# Patient Record
Sex: FEMALE | Race: WHITE | URBAN - METROPOLITAN AREA
[De-identification: names, ages, dates, MRNs, and addresses within clinical notes are randomized per-mention and may not be internally consistent; named-entity substitution may affect disease eponyms.]

---

## 2021-07-30 ENCOUNTER — OFFICE VISIT (OUTPATIENT)
Dept: URBAN - METROPOLITAN AREA CLINIC 58 | Facility: CLINIC | Age: 71
End: 2021-07-30
Payer: MEDICARE

## 2021-07-30 DIAGNOSIS — H25.13 AGE-RELATED NUCLEAR CATARACT, BILATERAL: ICD-10-CM

## 2021-07-30 DIAGNOSIS — G43.109 MIGRAINE W/ AURA: Primary | ICD-10-CM

## 2021-07-30 PROCEDURE — 92004 COMPRE OPH EXAM NEW PT 1/>: CPT | Performed by: OPTOMETRIST

## 2021-07-30 ASSESSMENT — INTRAOCULAR PRESSURE
OD: 12
OS: 13

## 2021-07-30 NOTE — IMPRESSION/PLAN
Impression: Age-related nuclear cataract, bilateral: H25.13. Plan: Discussed diagnosis in detail with patient. No treatment is required at this time. Will continue to observe condition and or symptoms. Call if [address] worsens.

## 2021-07-30 NOTE — IMPRESSION/PLAN
Impression: Migraine w/ aura: G43.109. Plan: Discussed diagnosis in detail with patient. No treatment is required at this time. Will continue to observe condition and or symptoms. Call if [address] worsens.

## 2022-08-01 ENCOUNTER — OFFICE VISIT (OUTPATIENT)
Dept: URBAN - METROPOLITAN AREA CLINIC 58 | Facility: CLINIC | Age: 72
End: 2022-08-01
Payer: MEDICARE

## 2022-08-01 DIAGNOSIS — H25.13 AGE-RELATED NUCLEAR CATARACT, BILATERAL: Primary | ICD-10-CM

## 2022-08-01 DIAGNOSIS — H40.013 OPEN ANGLE WITH BORDERLINE FINDINGS, LOW RISK, BILATERAL: ICD-10-CM

## 2022-08-01 PROCEDURE — 92014 COMPRE OPH EXAM EST PT 1/>: CPT | Performed by: OPTOMETRIST

## 2022-08-01 ASSESSMENT — INTRAOCULAR PRESSURE
OS: 22
OD: 18
OS: 20
OD: 20

## 2022-08-01 ASSESSMENT — VISUAL ACUITY
OD: 20/20
OS: 20/40

## 2022-08-01 NOTE — IMPRESSION/PLAN
Impression: Age-related nuclear cataract, bilateral: H25.13. Plan: Discussed diagnosis in detail with patient. Patient declines cataract surgery at this time. Will continue to observe condition and or symptoms. Call if [address] worsens.

## 2023-08-02 ENCOUNTER — OFFICE VISIT (OUTPATIENT)
Dept: URBAN - METROPOLITAN AREA CLINIC 58 | Facility: CLINIC | Age: 73
End: 2023-08-02
Payer: MEDICARE

## 2023-08-02 DIAGNOSIS — H25.13 AGE-RELATED NUCLEAR CATARACT, BILATERAL: Primary | ICD-10-CM

## 2023-08-02 PROCEDURE — 99214 OFFICE O/P EST MOD 30 MIN: CPT | Performed by: OPTOMETRIST

## 2023-08-02 ASSESSMENT — INTRAOCULAR PRESSURE
OS: 23
OD: 22

## 2023-08-02 ASSESSMENT — VISUAL ACUITY
OD: 20/30
OS: 20/25

## 2023-08-15 ENCOUNTER — OFFICE VISIT (OUTPATIENT)
Dept: URBAN - METROPOLITAN AREA CLINIC 58 | Facility: CLINIC | Age: 73
End: 2023-08-15
Payer: MEDICARE

## 2023-08-15 DIAGNOSIS — H40.013 OPEN ANGLE WITH BORDERLINE FINDINGS, LOW RISK, BILATERAL: ICD-10-CM

## 2023-08-15 DIAGNOSIS — H25.13 AGE-RELATED NUCLEAR CATARACT, BILATERAL: Primary | ICD-10-CM

## 2023-08-15 PROCEDURE — 99204 OFFICE O/P NEW MOD 45 MIN: CPT | Performed by: OPHTHALMOLOGY

## 2023-08-15 ASSESSMENT — VISUAL ACUITY
OD: 20/40
OS: 20/40

## 2023-08-15 ASSESSMENT — INTRAOCULAR PRESSURE
OS: 20
OD: 18

## 2023-08-15 ASSESSMENT — KERATOMETRY
OS: 41.88
OD: 41.75

## 2023-08-24 ENCOUNTER — TESTING ONLY (OUTPATIENT)
Dept: URBAN - METROPOLITAN AREA CLINIC 58 | Facility: CLINIC | Age: 73
End: 2023-08-24
Payer: MEDICARE

## 2023-08-24 DIAGNOSIS — H25.13 AGE-RELATED NUCLEAR CATARACT, BILATERAL: Primary | ICD-10-CM

## 2023-08-24 PROCEDURE — W9997 IOL SELECTION: HCPCS | Performed by: OPHTHALMOLOGY

## 2023-08-24 ASSESSMENT — PACHYMETRY
OD: 2.34
OD: 23.62
OS: 2.36
OS: 23.60

## 2023-09-05 ENCOUNTER — SURGERY (OUTPATIENT)
Dept: URBAN - METROPOLITAN AREA SURGERY 32 | Facility: SURGERY | Age: 73
End: 2023-09-05
Payer: MEDICARE

## 2023-09-05 DIAGNOSIS — H25.13 AGE-RELATED NUCLEAR CATARACT, BILATERAL: Primary | ICD-10-CM

## 2023-09-05 PROCEDURE — 66984 XCAPSL CTRC RMVL W/O ECP: CPT | Performed by: OPHTHALMOLOGY

## 2023-09-06 ENCOUNTER — POST-OPERATIVE VISIT (OUTPATIENT)
Dept: URBAN - METROPOLITAN AREA CLINIC 58 | Facility: CLINIC | Age: 73
End: 2023-09-06
Payer: MEDICARE

## 2023-09-06 DIAGNOSIS — Z48.810 ENCOUNTER FOR SURGICAL AFTERCARE FOLLOWING SURGERY ON A SENSE ORGAN: Primary | ICD-10-CM

## 2023-09-06 PROCEDURE — 99024 POSTOP FOLLOW-UP VISIT: CPT | Performed by: OPTOMETRIST

## 2023-09-06 ASSESSMENT — INTRAOCULAR PRESSURE
OD: 17
OS: 18

## 2023-09-13 ENCOUNTER — POST-OPERATIVE VISIT (OUTPATIENT)
Dept: URBAN - METROPOLITAN AREA CLINIC 58 | Facility: CLINIC | Age: 73
End: 2023-09-13
Payer: MEDICARE

## 2023-09-13 DIAGNOSIS — Z48.810 ENCOUNTER FOR SURGICAL AFTERCARE FOLLOWING SURGERY ON A SENSE ORGAN: Primary | ICD-10-CM

## 2023-09-13 PROCEDURE — 99024 POSTOP FOLLOW-UP VISIT: CPT | Performed by: OPTOMETRIST

## 2023-09-13 RX ORDER — PREDNISOLONE ACETATE 10 MG/ML
1 % SUSPENSION/ DROPS OPHTHALMIC
Qty: 5 | Refills: 0 | Status: ACTIVE
Start: 2023-09-13

## 2023-09-13 ASSESSMENT — VISUAL ACUITY
OD: 20/30
OS: 20/40

## 2023-09-13 ASSESSMENT — KERATOMETRY
OD: 41.50
OS: 41.63

## 2023-09-13 ASSESSMENT — INTRAOCULAR PRESSURE
OS: 13
OD: 11

## 2023-09-25 ENCOUNTER — SURGERY (OUTPATIENT)
Dept: URBAN - METROPOLITAN AREA SURGERY 32 | Facility: SURGERY | Age: 73
End: 2023-09-25
Payer: MEDICARE

## 2023-09-25 DIAGNOSIS — H25.12 AGE-RELATED NUCLEAR CATARACT, LEFT EYE: Primary | ICD-10-CM

## 2023-09-25 PROCEDURE — 66984 XCAPSL CTRC RMVL W/O ECP: CPT | Performed by: OPHTHALMOLOGY

## 2023-09-26 ENCOUNTER — POST-OPERATIVE VISIT (OUTPATIENT)
Dept: URBAN - METROPOLITAN AREA CLINIC 58 | Facility: CLINIC | Age: 73
End: 2023-09-26
Payer: MEDICARE

## 2023-09-26 DIAGNOSIS — Z96.1 PRESENCE OF INTRAOCULAR LENS: Primary | ICD-10-CM

## 2023-09-26 PROCEDURE — 99024 POSTOP FOLLOW-UP VISIT: CPT | Performed by: OPTOMETRIST

## 2023-09-26 ASSESSMENT — INTRAOCULAR PRESSURE
OD: 11
OS: 12

## 2023-10-05 ENCOUNTER — POST-OPERATIVE VISIT (OUTPATIENT)
Dept: URBAN - METROPOLITAN AREA CLINIC 58 | Facility: CLINIC | Age: 73
End: 2023-10-05
Payer: MEDICARE

## 2023-10-05 DIAGNOSIS — Z96.1 PRESENCE OF INTRAOCULAR LENS: Primary | ICD-10-CM

## 2023-10-05 PROCEDURE — 99024 POSTOP FOLLOW-UP VISIT: CPT | Performed by: OPTOMETRIST

## 2023-10-05 ASSESSMENT — INTRAOCULAR PRESSURE
OD: 14
OS: 13

## 2024-01-05 ENCOUNTER — OFFICE VISIT (OUTPATIENT)
Dept: URBAN - METROPOLITAN AREA CLINIC 58 | Facility: CLINIC | Age: 74
End: 2024-01-05
Payer: COMMERCIAL

## 2024-01-05 DIAGNOSIS — H40.013 OPEN ANGLE WITH BORDERLINE FINDINGS, LOW RISK, BILATERAL: Primary | ICD-10-CM

## 2024-01-05 PROCEDURE — 92014 COMPRE OPH EXAM EST PT 1/>: CPT | Performed by: OPTOMETRIST

## 2024-01-05 PROCEDURE — 92133 CPTRZD OPH DX IMG PST SGM ON: CPT | Performed by: OPTOMETRIST

## 2024-01-05 ASSESSMENT — INTRAOCULAR PRESSURE
OS: 20
OD: 17

## 2024-01-05 ASSESSMENT — VISUAL ACUITY
OS: 20/20
OD: 20/20